# Patient Record
Sex: MALE | Race: BLACK OR AFRICAN AMERICAN | NOT HISPANIC OR LATINO | Employment: OTHER | URBAN - METROPOLITAN AREA
[De-identification: names, ages, dates, MRNs, and addresses within clinical notes are randomized per-mention and may not be internally consistent; named-entity substitution may affect disease eponyms.]

---

## 2018-04-04 ENCOUNTER — HOSPITAL ENCOUNTER (EMERGENCY)
Facility: OTHER | Age: 58
Discharge: HOME | End: 2018-04-04
Attending: EMERGENCY MEDICINE
Payer: MEDICARE

## 2018-04-04 VITALS
BODY MASS INDEX: 39.99 KG/M2 | DIASTOLIC BLOOD PRESSURE: 88 MMHG | HEART RATE: 74 BPM | RESPIRATION RATE: 18 BRPM | HEIGHT: 69 IN | SYSTOLIC BLOOD PRESSURE: 138 MMHG | OXYGEN SATURATION: 95 % | TEMPERATURE: 98 F | WEIGHT: 270 LBS

## 2018-04-04 DIAGNOSIS — M10.9 ACUTE GOUT OF LEFT WRIST, UNSPECIFIED CAUSE: Primary | ICD-10-CM

## 2018-04-04 DIAGNOSIS — M25.532 WRIST PAIN, ACUTE, LEFT: ICD-10-CM

## 2018-04-04 PROCEDURE — 25000003 PHARM REV CODE 250: Performed by: EMERGENCY MEDICINE

## 2018-04-04 PROCEDURE — 99283 EMERGENCY DEPT VISIT LOW MDM: CPT

## 2018-04-04 PROCEDURE — 63600175 PHARM REV CODE 636 W HCPCS: Performed by: EMERGENCY MEDICINE

## 2018-04-04 RX ORDER — IBUPROFEN 600 MG/1
600 TABLET ORAL
Status: COMPLETED | OUTPATIENT
Start: 2018-04-04 | End: 2018-04-04

## 2018-04-04 RX ORDER — TRAMADOL HYDROCHLORIDE 50 MG/1
50 TABLET ORAL EVERY 6 HOURS PRN
Qty: 10 TABLET | Refills: 0 | Status: SHIPPED | OUTPATIENT
Start: 2018-04-04 | End: 2018-04-14

## 2018-04-04 RX ORDER — PREDNISONE 20 MG/1
40 TABLET ORAL
Status: COMPLETED | OUTPATIENT
Start: 2018-04-04 | End: 2018-04-04

## 2018-04-04 RX ORDER — INDOMETHACIN 50 MG/1
50 CAPSULE ORAL
Qty: 21 CAPSULE | Refills: 0 | Status: SHIPPED | OUTPATIENT
Start: 2018-04-04 | End: 2020-03-02 | Stop reason: SDUPTHER

## 2018-04-04 RX ADMIN — PREDNISONE 40 MG: 20 TABLET ORAL at 12:04

## 2018-04-04 RX ADMIN — IBUPROFEN 600 MG: 600 TABLET, FILM COATED ORAL at 12:04

## 2018-04-04 NOTE — ED PROVIDER NOTES
Encounter Date: 4/4/2018    SCRIBE #1 NOTE: IKylie, am scribing for, and in the presence of, Dr. Delvalle.       History     Chief Complaint   Patient presents with    Gout     Pt c/o pain to both hands with swelling to the left hand. Pt reports a hx of gout     Time seen by provider: 11:59 AM    This is a 57 y.o. Male, with a history of HTN, who presents with complaint of left hand pain that began about one week ago. Patient reports gradual onset of hand pain and swelling. He reports pain worsens with movement. He denies any injury or trauma. He reports chronic right hand pain since previous episode years ago, then seen here and dx with gout and carpal tunnel. He has been non-compliant with wrist splint since then. He denies following a gout diet, and reports recent increase in seafood consumption. He reports taking naproxen with little relief. He reports being right hand dominant. He denies fever, chills, numbness, weakness, or skin color change.       The history is provided by the patient.     Review of patient's allergies indicates:  No Known Allergies  Past Medical History:   Diagnosis Date    Arthritis     GERD (gastroesophageal reflux disease)     Hypertension      Past Surgical History:   Procedure Laterality Date    NO PAST SURGERIES       History reviewed. No pertinent family history.  Social History   Substance Use Topics    Smoking status: Never Smoker    Smokeless tobacco: Never Used    Alcohol use Yes      Comment: rarely     Review of Systems   Constitutional: Negative for chills and fever.   HENT: Negative for sore throat.    Respiratory: Negative for shortness of breath.    Cardiovascular: Negative for chest pain.   Gastrointestinal: Negative for nausea.   Genitourinary: Negative for dysuria.   Musculoskeletal: Negative for back pain and neck pain.        Positive for bilateral hand pain and swelling.    Skin: Negative for color change and rash.   Neurological: Negative for  weakness.   Hematological: Does not bruise/bleed easily.       Physical Exam     Initial Vitals [04/04/18 1126]   BP Pulse Resp Temp SpO2   (!) 179/94 94 18 97.2 °F (36.2 °C) 95 %      MAP       122.33         Physical Exam    Nursing note and vitals reviewed.  Constitutional: He appears well-developed and well-nourished. He is not diaphoretic. No distress.   HENT:   Head: Normocephalic and atraumatic.   Right Ear: External ear normal.   Left Ear: External ear normal.   Eyes: EOM are normal. Right eye exhibits no discharge. Left eye exhibits no discharge.   Neck: Normal range of motion.   Cardiovascular: Normal rate, regular rhythm and normal heart sounds.   No murmur heard.  Pulmonary/Chest: Breath sounds normal. No respiratory distress. He has no wheezes. He has no rhonchi. He has no rales.   Abdominal: Soft. There is no tenderness. There is no rebound and no guarding.   Musculoskeletal: He exhibits edema and tenderness.   Left wrist and hand mild swelling and diffuse tenderness. ROM limited secondary to pain, but intact.   Right wrist minimal edema and pain with R hand flexion   Neurological: He is alert and oriented to person, place, and time. He has normal strength. No cranial nerve deficit or sensory deficit.   Skin: Skin is warm and dry. No rash and no abscess noted. No erythema. No pallor.   Psychiatric: He has a normal mood and affect. His behavior is normal. Judgment and thought content normal.         ED Course   Procedures  Labs Reviewed - No data to display     Imaging Results          X-Ray Wrist Complete Left (Final result)  Result time 04/04/18 15:15:49    Final result by Jackie Louie MD (04/04/18 15:15:49)                 Impression:      No acute fracture.  Soft tissue swelling.  Wrist effusion.      Electronically signed by: Jackie Louie MD  Date:    04/04/2018  Time:    15:15             Narrative:    EXAMINATION:  LEFT WRIST    CLINICAL HISTORY:  PAIN    TECHNIQUE:  Three views of  the left wrist were obtained.    COMPARISON:  None    FINDINGS:  There is no evidence of acute fracture, dislocation, or bone destruction.  Soft tissue swelling is present.  There is a wrist effusion.                               X-Ray Hand 3 view Left (Final result)  Result time 04/04/18 15:15:02    Final result by Jackie Louie MD (04/04/18 15:15:02)                 Impression:      Soft tissue swelling and probable wrist joint effusion.  No definite erosion.    Findings discussed with Dr. Delvalle      Electronically signed by: Jackie Louie MD  Date:    04/04/2018  Time:    15:15             Narrative:    EXAMINATION:  LEFT HAND    CLINICAL HISTORY:  SWELLING    TECHNIQUE:  THREE VIEWS OF THE LEFT HAND WERE OBTAINED.    COMPARISON:  NONE    FINDINGS:  Alignment is within normal limits.  mild distal interphalangeal joint space narrowing is present.  no definite erosions or soft tissue calcifications are seen.  there is marked soft tissue swelling.  slight irregularity is noted at posterior base of 1 of the metacarpals seen on lateral view only.  This most likely reflects a fragmented osteophyte although small erosion at this site is not excluded.  A wrist joint effusion is suspected.                                X-Rays:   Independently Interpreted Readings:   Other Readings:  Left Wrist: Wrist joint effusion. No fracture or dislocation.     Medical Decision Making:   Initial Assessment:       57 y.o. Male with a history of HTN presents with one week of worsening left hand and wrist pain and swelling. No known trauma or injury. He had similar right hand edema years ago diagnosed with gout and carpal tunnel. Noncompliant with wrist splint since then and has not followed up with hand surgery. Exam with diffuse edema and tenderness of L wrist and dorsal hand, with ROM intact. No fever, erythema, or sign of septic arthritis. X-rays with no sign of fracture, but does show small wrist effusion. Will  treat suspected L wrist gout exacerbation with prednisone in ER and Rx indocin on discharge. Will also restart right wrist splint for persistent carpal tunnel symptoms and refer to hand clinic.       Clinical Tests:   Radiological Study: Ordered and Reviewed              Attending Attestation:           Physician Attestation for Scribe:  Physician Attestation Statement for Scribe #1: I, Dr. Delvalle, reviewed documentation, as scribed by Kylie Samaniego in my presence, and it is both accurate and complete.                    Clinical Impression:     1. Acute gout of left wrist, unspecified cause    2. Wrist pain, acute, left                                 Stefano CHRISTIANO Delvalle MD  04/05/18 0101

## 2018-04-04 NOTE — ED NOTES
Pt with swelling to bilateral hands x 1 week. Pt reporting hx of gout. Swelling, warmth, redness to Left hand > Right hand. Pt AAOx4 and appropriate at this time. Respirations even and unlabored. No acute distress noted.

## 2019-02-15 ENCOUNTER — HOSPITAL ENCOUNTER (EMERGENCY)
Facility: OTHER | Age: 59
Discharge: HOME | End: 2019-02-15
Attending: EMERGENCY MEDICINE
Payer: MEDICARE

## 2019-02-15 VITALS
OXYGEN SATURATION: 95 % | WEIGHT: 280 LBS | DIASTOLIC BLOOD PRESSURE: 83 MMHG | SYSTOLIC BLOOD PRESSURE: 131 MMHG | TEMPERATURE: 98 F | RESPIRATION RATE: 19 BRPM | HEIGHT: 69 IN | BODY MASS INDEX: 41.47 KG/M2 | HEART RATE: 94 BPM

## 2019-02-15 DIAGNOSIS — J02.9 VIRAL PHARYNGITIS: ICD-10-CM

## 2019-02-15 DIAGNOSIS — J01.80 ACUTE NON-RECURRENT SINUSITIS OF OTHER SINUS: Primary | ICD-10-CM

## 2019-02-15 LAB — DEPRECATED S PYO AG THROAT QL EIA: NEGATIVE

## 2019-02-15 PROCEDURE — 99284 EMERGENCY DEPT VISIT MOD MDM: CPT | Mod: 25

## 2019-02-15 PROCEDURE — 63600175 PHARM REV CODE 636 W HCPCS: Performed by: PHYSICIAN ASSISTANT

## 2019-02-15 PROCEDURE — 96372 THER/PROPH/DIAG INJ SC/IM: CPT

## 2019-02-15 PROCEDURE — 87880 STREP A ASSAY W/OPTIC: CPT

## 2019-02-15 PROCEDURE — 87081 CULTURE SCREEN ONLY: CPT

## 2019-02-15 RX ORDER — DEXAMETHASONE SODIUM PHOSPHATE 4 MG/ML
8 INJECTION, SOLUTION INTRA-ARTICULAR; INTRALESIONAL; INTRAMUSCULAR; INTRAVENOUS; SOFT TISSUE
Status: COMPLETED | OUTPATIENT
Start: 2019-02-15 | End: 2019-02-15

## 2019-02-15 RX ORDER — FLUTICASONE PROPIONATE 50 MCG
1 SPRAY, SUSPENSION (ML) NASAL 2 TIMES DAILY PRN
Qty: 15 G | Refills: 0 | Status: SHIPPED | OUTPATIENT
Start: 2019-02-15

## 2019-02-15 RX ADMIN — DEXAMETHASONE SODIUM PHOSPHATE 8 MG: 4 INJECTION, SOLUTION INTRAMUSCULAR; INTRAVENOUS at 02:02

## 2019-02-16 NOTE — ED PROVIDER NOTES
"Encounter Date: 2/15/2019       History     Chief Complaint   Patient presents with    Sore Throat     x 1 week    "sinus pressure"     > than 1 week     Patient is a 57-year-old male with history of hypertension who presents to the emergency department with sinus pressure.  Patient states over the last 2 weeks he has had congestion and rhinorrhea.  He reports pressure in the frontal region of his head.  He states over the last 2 days he has developed a postnasal drip and sore throat. He denies inability to control secretions.  He denies fevers or chills.  He denies body aches.  He states his pressure in his head feels better if he lies flat, but more bothersome if he is sitting up or leaning forward.  He denies cough or sputum production. He denies neck pain or neck stiffness.  He denies any other associated symptoms.      The history is provided by the patient.     Review of patient's allergies indicates:  No Known Allergies  Past Medical History:   Diagnosis Date    Arthritis     GERD (gastroesophageal reflux disease)     Hypertension      Past Surgical History:   Procedure Laterality Date    NO PAST SURGERIES       No family history on file.  Social History     Tobacco Use    Smoking status: Never Smoker    Smokeless tobacco: Never Used   Substance Use Topics    Alcohol use: Yes     Comment: rarely    Drug use: No     Review of Systems   Constitutional: Negative for activity change, appetite change, chills, fatigue and fever.   HENT: Positive for congestion, postnasal drip, rhinorrhea, sinus pressure, sinus pain, sore throat and trouble swallowing. Negative for ear discharge, ear pain, nosebleeds and voice change.    Respiratory: Negative for cough and shortness of breath.    Cardiovascular: Negative for chest pain.   Gastrointestinal: Negative for abdominal pain, blood in stool, constipation, diarrhea, nausea and vomiting.   Genitourinary: Negative for dysuria, flank pain and hematuria. "   Musculoskeletal: Negative for back pain, neck pain and neck stiffness.   Skin: Negative for rash and wound.   Neurological: Negative for dizziness, weakness, light-headedness and headaches.       Physical Exam     Initial Vitals [02/15/19 1318]   BP Pulse Resp Temp SpO2   (!) 180/94 102 18 98.2 °F (36.8 °C) 95 %      MAP       --         Physical Exam    Nursing note and vitals reviewed.  Constitutional: He appears well-developed and well-nourished. He is not diaphoretic.  Non-toxic appearance. No distress.   HENT:   Head: Normocephalic.   Right Ear: Hearing, tympanic membrane, external ear and ear canal normal.   Left Ear: Hearing and external ear normal. A middle ear effusion is present.   Nose: Mucosal edema and rhinorrhea present. Right sinus exhibits frontal sinus tenderness. Right sinus exhibits no maxillary sinus tenderness. Left sinus exhibits frontal sinus tenderness. Left sinus exhibits no maxillary sinus tenderness.   Mouth/Throat: Uvula is midline and mucous membranes are normal. No trismus in the jaw. No uvula swelling. Posterior oropharyngeal erythema present. No oropharyngeal exudate or tonsillar abscesses.   Posterior oropharyngeal cobblestoning   Eyes: Conjunctivae are normal. Pupils are equal, round, and reactive to light.   Neck: Normal range of motion and full passive range of motion without pain. Neck supple. Normal range of motion present. No neck rigidity.   Cardiovascular: Normal rate and regular rhythm.   Pulmonary/Chest: Breath sounds normal. No respiratory distress. He has no wheezes. He has no rhonchi. He has no rales. He exhibits no tenderness.   Abdominal: Soft. Bowel sounds are normal. There is no tenderness.   Lymphadenopathy:     He has no cervical adenopathy.   Neurological: He is alert and oriented to person, place, and time.   Skin: Skin is warm and dry. Capillary refill takes less than 2 seconds.   Psychiatric: He has a normal mood and affect.         ED Course    Procedures  Labs Reviewed   THROAT SCREEN, RAPID   CULTURE, STREP A,  THROAT          Imaging Results    None          Medical Decision Making:   Initial Assessment:   Urgent evaluation of a 58-year-old male with history of hypertension who presents to the emergency department with nasal congestion and sore throat. Patient is afebrile, nontoxic appearing, and hemodynamically stable. There is nasal mucosal edema.  There is posterior oropharyngeal cobblestoning.  There is posterior oropharyngeal erythema.  Uvula is midline. No trismus.  No evidence of peritonsillar abscess.  Patient is controlling secretions.  I suspect viral sinusitis and viral pharyngitis.  Rapid strep is obtained and is negative. Patient is given Decadron.  Patient is advised to follow up with PCP.  Patient is advised to return to the emergency department with any worsening symptoms or concerns.                      Clinical Impression:   The primary encounter diagnosis was Acute non-recurrent sinusitis of other sinus. A diagnosis of Viral pharyngitis was also pertinent to this visit.                             Amparo Altamirano-SAMMY Tirado  02/16/19 0014

## 2019-02-18 LAB — BACTERIA THROAT CULT: NORMAL

## 2019-09-03 ENCOUNTER — HOSPITAL ENCOUNTER (EMERGENCY)
Facility: OTHER | Age: 59
Discharge: HOME | End: 2019-09-03
Attending: EMERGENCY MEDICINE
Payer: MEDICARE

## 2019-09-03 VITALS
WEIGHT: 285 LBS | DIASTOLIC BLOOD PRESSURE: 80 MMHG | HEIGHT: 69 IN | SYSTOLIC BLOOD PRESSURE: 149 MMHG | BODY MASS INDEX: 42.21 KG/M2 | TEMPERATURE: 98 F | OXYGEN SATURATION: 95 % | HEART RATE: 81 BPM | RESPIRATION RATE: 16 BRPM

## 2019-09-03 DIAGNOSIS — R11.10 VOMITING AND DIARRHEA: Primary | ICD-10-CM

## 2019-09-03 DIAGNOSIS — R19.7 VOMITING AND DIARRHEA: Primary | ICD-10-CM

## 2019-09-03 LAB
ALBUMIN SERPL BCP-MCNC: 5 G/DL (ref 3.5–5.2)
ALP SERPL-CCNC: 88 U/L (ref 55–135)
ALT SERPL W/O P-5'-P-CCNC: 74 U/L (ref 10–44)
ANION GAP SERPL CALC-SCNC: 16 MMOL/L (ref 8–16)
AST SERPL-CCNC: 53 U/L (ref 10–40)
BACTERIA #/AREA URNS HPF: ABNORMAL /HPF
BASOPHILS # BLD AUTO: 0.05 K/UL (ref 0–0.2)
BASOPHILS NFR BLD: 0.8 % (ref 0–1.9)
BILIRUB SERPL-MCNC: 0.7 MG/DL (ref 0.1–1)
BILIRUB UR QL STRIP: NEGATIVE
BUN SERPL-MCNC: 21 MG/DL (ref 6–20)
CALCIUM SERPL-MCNC: 10.9 MG/DL (ref 8.7–10.5)
CHLORIDE SERPL-SCNC: 98 MMOL/L (ref 95–110)
CLARITY UR: CLEAR
CO2 SERPL-SCNC: 28 MMOL/L (ref 23–29)
COLOR UR: YELLOW
CREAT SERPL-MCNC: 1.8 MG/DL (ref 0.5–1.4)
DIFFERENTIAL METHOD: NORMAL
EOSINOPHIL # BLD AUTO: 0.2 K/UL (ref 0–0.5)
EOSINOPHIL NFR BLD: 2.8 % (ref 0–8)
ERYTHROCYTE [DISTWIDTH] IN BLOOD BY AUTOMATED COUNT: 13.9 % (ref 11.5–14.5)
EST. GFR  (AFRICAN AMERICAN): 48 ML/MIN/1.73 M^2
EST. GFR  (NON AFRICAN AMERICAN): 41 ML/MIN/1.73 M^2
GLUCOSE SERPL-MCNC: 103 MG/DL (ref 70–110)
GLUCOSE UR QL STRIP: NEGATIVE
HCT VFR BLD AUTO: 48.1 % (ref 40–54)
HGB BLD-MCNC: 16.2 G/DL (ref 14–18)
HGB UR QL STRIP: ABNORMAL
HYALINE CASTS #/AREA URNS LPF: 12 /LPF
IMM GRANULOCYTES # BLD AUTO: 0.01 K/UL (ref 0–0.04)
IMM GRANULOCYTES NFR BLD AUTO: 0.2 % (ref 0–0.5)
KETONES UR QL STRIP: NEGATIVE
LEUKOCYTE ESTERASE UR QL STRIP: NEGATIVE
LIPASE SERPL-CCNC: 16 U/L (ref 4–60)
LYMPHOCYTES # BLD AUTO: 2.8 K/UL (ref 1–4.8)
LYMPHOCYTES NFR BLD: 46 % (ref 18–48)
MCH RBC QN AUTO: 29.4 PG (ref 27–31)
MCHC RBC AUTO-ENTMCNC: 33.7 G/DL (ref 32–36)
MCV RBC AUTO: 87 FL (ref 82–98)
MICROSCOPIC COMMENT: ABNORMAL
MONOCYTES # BLD AUTO: 0.6 K/UL (ref 0.3–1)
MONOCYTES NFR BLD: 10.3 % (ref 4–15)
NEUTROPHILS # BLD AUTO: 2.5 K/UL (ref 1.8–7.7)
NEUTROPHILS NFR BLD: 39.9 % (ref 38–73)
NITRITE UR QL STRIP: NEGATIVE
NRBC BLD-RTO: 0 /100 WBC
PH UR STRIP: 6 [PH] (ref 5–8)
PLATELET # BLD AUTO: 277 K/UL (ref 150–350)
PMV BLD AUTO: 9.6 FL (ref 9.2–12.9)
POCT GLUCOSE: 82 MG/DL (ref 70–110)
POTASSIUM SERPL-SCNC: 3 MMOL/L (ref 3.5–5.1)
PROT SERPL-MCNC: 8.5 G/DL (ref 6–8.4)
PROT UR QL STRIP: ABNORMAL
RBC # BLD AUTO: 5.51 M/UL (ref 4.6–6.2)
RBC #/AREA URNS HPF: 2 /HPF (ref 0–4)
SODIUM SERPL-SCNC: 142 MMOL/L (ref 136–145)
SP GR UR STRIP: 1.02 (ref 1–1.03)
URN SPEC COLLECT METH UR: ABNORMAL
UROBILINOGEN UR STRIP-ACNC: NEGATIVE EU/DL
WBC # BLD AUTO: 6.13 K/UL (ref 3.9–12.7)
WBC #/AREA URNS HPF: 3 /HPF (ref 0–5)

## 2019-09-03 PROCEDURE — 99283 EMERGENCY DEPT VISIT LOW MDM: CPT | Mod: 25

## 2019-09-03 PROCEDURE — 82962 GLUCOSE BLOOD TEST: CPT

## 2019-09-03 PROCEDURE — 25000003 PHARM REV CODE 250: Performed by: STUDENT IN AN ORGANIZED HEALTH CARE EDUCATION/TRAINING PROGRAM

## 2019-09-03 PROCEDURE — 80053 COMPREHEN METABOLIC PANEL: CPT

## 2019-09-03 PROCEDURE — 85025 COMPLETE CBC W/AUTO DIFF WBC: CPT

## 2019-09-03 PROCEDURE — 83690 ASSAY OF LIPASE: CPT

## 2019-09-03 PROCEDURE — 81000 URINALYSIS NONAUTO W/SCOPE: CPT

## 2019-09-03 RX ORDER — ONDANSETRON 4 MG/1
4 TABLET, FILM COATED ORAL
Status: COMPLETED | OUTPATIENT
Start: 2019-09-03 | End: 2019-09-03

## 2019-09-03 RX ORDER — NIFEDIPINE 90 MG/1
30 TABLET, FILM COATED, EXTENDED RELEASE ORAL DAILY
COMMUNITY

## 2019-09-03 RX ORDER — POTASSIUM CHLORIDE 20 MEQ/1
40 TABLET, EXTENDED RELEASE ORAL
Status: COMPLETED | OUTPATIENT
Start: 2019-09-03 | End: 2019-09-03

## 2019-09-03 RX ORDER — METFORMIN HYDROCHLORIDE 1000 MG/1
1000 TABLET ORAL 2 TIMES DAILY WITH MEALS
COMMUNITY

## 2019-09-03 RX ORDER — ONDANSETRON 4 MG/1
4 TABLET, ORALLY DISINTEGRATING ORAL EVERY 8 HOURS PRN
Qty: 8 TABLET | Refills: 0 | Status: SHIPPED | OUTPATIENT
Start: 2019-09-03

## 2019-09-03 RX ADMIN — POTASSIUM CHLORIDE 40 MEQ: 20 TABLET, EXTENDED RELEASE ORAL at 09:09

## 2019-09-03 RX ADMIN — ONDANSETRON 4 MG: 4 TABLET, FILM COATED ORAL at 08:09

## 2019-09-04 NOTE — ED NOTES
Pt c/o nausea, vomiting x several days; was instructed to come to ED for further eval for elevated blood sugar by his PCP. Denies diarrhea at this time. Pt AAOx4 and appropriate at this time. Respirations even and unlabored. No acute distress noted.

## 2019-09-04 NOTE — ED NOTES
Appearance: Pt awake, alert & oriented to person, place & time. Pt in no acute distress at present time. Pt is clean and well groomed with clothes appropriately fastened.   Skin: Skin warm, dry & intact. Color consistent with ethnicity. Mucous membranes moist. No breakdown or brusing noted.   Musculoskeletal: Patient moving all extremities well, no obvious swelling or deformities noted.   Respiratory: Respirations spontaneous, even, and non-labored. Visible chest rise noted. Airway is open and patent. No accessory muscle use noted.   Neurologic: Sensation is intact. Speech is clear and appropriate. Eyes open spontaneously, behavior appropriate to situation, follows commands,  purposeful motor response noted.   Cardiac:  No Bilateral lower extremity edema. Cap refill is <3 seconds. Pt denies active chest pains, SOB, dizziness, blurred vision, weakness or fatigue at this time.   Abdomen: Pt denies active abd pains, cramping or discomfort, + reports of nausea; no active vomiting from pt at this time.   : Pt reports no dysuria or hematuria.

## 2019-09-04 NOTE — ED PROVIDER NOTES
Encounter Date: 9/3/2019       History     Chief Complaint   Patient presents with    Emesis     with nausea, on and off X a week     Pt is a 59 y/o man w/ PMHx of DM II, HTN who presents to ED c/o diarrhea and vomiting for about 1 week. The diarrhea began about 1 week ago and has subsided over the past 2 days. He denies any bloody or tarry stools. He has had one small normal BM since the diarrhea stopped. When the diarrhea slowed, he began vomiting and feeling nauseous. He denies any blood in his vomitus. Of note, pt was switched to nifedipine for HTN control and began metformin 1000 BID about 4 days prior to his onset of symptoms. He endorses occasional chills, and rarely experiences mild abdominal pain. He denies any HA, SOB, chest pain, flank pain, or dysuria.    Pt also c/o fullness in ears b/l and sinus pressure. He endorses minor eye discharge and b/l orbital itching. He denies a cough, sneezing, or sore throat. These symptoms are chronic. He was given a nose spray (unknown) by his PCP but that did not provide any relief.        Review of patient's allergies indicates:  No Known Allergies  Past Medical History:   Diagnosis Date    Arthritis     Diabetes mellitus     GERD (gastroesophageal reflux disease)     Hypertension      Past Surgical History:   Procedure Laterality Date    NO PAST SURGERIES       No family history on file.  Social History     Tobacco Use    Smoking status: Never Smoker    Smokeless tobacco: Never Used   Substance Use Topics    Alcohol use: Yes     Comment: rarely    Drug use: No     Types: Marijuana     Review of Systems   Constitutional: Positive for appetite change and chills. Negative for diaphoresis and fever.   HENT: Positive for ear pain, sinus pressure and sinus pain. Negative for facial swelling, nosebleeds, rhinorrhea, sore throat and tinnitus.    Eyes: Positive for discharge and itching. Negative for visual disturbance.   Respiratory: Negative for chest tightness,  shortness of breath and wheezing.    Cardiovascular: Negative for chest pain and palpitations.   Gastrointestinal: Positive for diarrhea, nausea and vomiting. Negative for abdominal pain.   Genitourinary: Negative for difficulty urinating.   Musculoskeletal: Negative for myalgias.   Skin: Negative for color change and rash.   Neurological: Negative for dizziness, syncope, light-headedness and headaches.   Hematological: Negative for adenopathy.   Psychiatric/Behavioral: Negative for confusion.   All other systems reviewed and are negative.      Physical Exam     Initial Vitals [09/03/19 1859]   BP Pulse Resp Temp SpO2   137/82 90 18 97.8 °F (36.6 °C) 95 %      MAP       --         Physical Exam    Constitutional: He appears well-developed and well-nourished. No distress.   HENT:   Head: Normocephalic and atraumatic.   Right Ear: Tympanic membrane, external ear and ear canal normal.   Left Ear: Tympanic membrane, external ear and ear canal normal.   Nose: Mucosal edema present. No rhinorrhea.   Mouth/Throat: No oropharyngeal exudate.   Eyes: Conjunctivae and EOM are normal. Right eye exhibits no discharge. Left eye exhibits no discharge. No scleral icterus.   Neck: Normal range of motion. Neck supple.   Cardiovascular: Normal rate, regular rhythm and normal heart sounds.   Pulmonary/Chest: Breath sounds normal. He has no wheezes.   Abdominal: Soft. He exhibits distension. He exhibits no mass. There is no tenderness. There is no guarding.   Musculoskeletal: Normal range of motion.   Lymphadenopathy:     He has no cervical adenopathy.   Neurological: He is alert and oriented to person, place, and time.   Skin: Skin is warm, dry and intact. No rash noted.   Psychiatric: He has a normal mood and affect. His speech is normal and behavior is normal.         ED Course   Procedures  Labs Reviewed   COMPREHENSIVE METABOLIC PANEL - Abnormal; Notable for the following components:       Result Value    Potassium 3.0 (*)     BUN,  Bld 21 (*)     Creatinine 1.8 (*)     Calcium 10.9 (*)     Total Protein 8.5 (*)     AST 53 (*)     ALT 74 (*)     eGFR if  48 (*)     eGFR if non  41 (*)     All other components within normal limits   URINALYSIS, REFLEX TO URINE CULTURE - Abnormal; Notable for the following components:    Protein, UA 1+ (*)     Occult Blood UA 1+ (*)     All other components within normal limits    Narrative:     Preferred Collection Type->Urine, Clean Catch   URINALYSIS MICROSCOPIC - Abnormal; Notable for the following components:    Hyaline Casts, UA 12 (*)     All other components within normal limits    Narrative:     Preferred Collection Type->Urine, Clean Catch   CBC W/ AUTO DIFFERENTIAL   LIPASE   POCT GLUCOSE   POCT GLUCOSE MONITORING CONTINUOUS          Imaging Results    None          Medical Decision Making:   ED Management:  - VSS; NAD  - Pt examined by resident and attending physician  - Hx notable for recent metformin rx  - Pt denies myalgias, fever, sore throat; influenza less likely  - CBC, CMP, POCT glucose, lipase ordered  - Elevated BUN, creatinine; no recent labs for comparison; likely prerenal etiology  - PO hydration and PO K administered  - Will discharge with zofran and adjust metformin dose to 500 BID until follow up with PCP  - Pt was able to tolerate PO challenge  - Stable for discharge                  Attending Attestation:   Physician Attestation Statement for Resident:  As the supervising MD   Physician Attestation Statement: I have personally seen and examined this patient.   I agree with the above history. -: 58-year-old male who presents with complaint of nausea vomiting and diarrhea which is now resolved.  He also reports some URI type symptoms.  Vital signs reveal mild hypertension, afebrile.     As the supervising MD I agree with the above PE.   -: Abdomen is protuberant but soft and nontender. Normal bowel sounds. Nontoxic and well-hydrated appearance.   As the  supervising MD I agree with the above treatment, course, plan, and disposition.   -: Labs do reveal hypokalemia and elevated BUN and creatinine, although not in a prerenal pattern.  No previous for comparison.  He was given oral hydration, potassium repletion, Zofran in the ED with improvement.  I am suspicious that starting high dosage of metformin prior to onset of symptoms may have contributed to diarrhea.  This may represent a viral syndrome or gastroenteritis.  There is no evidence of an acute abdomen.  He is encouraged to increase hydration, symptomatic care for URI symptoms, and given prescription for Zofran as needed for nausea.  He is encouraged close follow-up with his PCP or to return for new or worsening symptoms.  I have reviewed and agree with the residents interpretation of the following: lab data.                       Clinical Impression:       ICD-10-CM ICD-9-CM   1. Vomiting and diarrhea R11.10 787.03    R19.7 787.91         Disposition:   Disposition: Discharged  Condition: Stable       DESTINEY Maria MD  OBGYN PGY1                  Joon Maria MD  Resident  09/03/19 0569       Nancy Felix MD  09/04/19 2286

## 2020-03-02 ENCOUNTER — HOSPITAL ENCOUNTER (EMERGENCY)
Facility: OTHER | Age: 60
Discharge: HOME | End: 2020-03-02
Attending: EMERGENCY MEDICINE
Payer: MEDICARE

## 2020-03-02 VITALS
DIASTOLIC BLOOD PRESSURE: 78 MMHG | WEIGHT: 265 LBS | TEMPERATURE: 98 F | HEIGHT: 69 IN | BODY MASS INDEX: 39.25 KG/M2 | HEART RATE: 84 BPM | OXYGEN SATURATION: 95 % | SYSTOLIC BLOOD PRESSURE: 140 MMHG | RESPIRATION RATE: 18 BRPM

## 2020-03-02 DIAGNOSIS — M10.9 ACUTE GOUT OF RIGHT HAND, UNSPECIFIED CAUSE: Primary | ICD-10-CM

## 2020-03-02 PROCEDURE — 96372 THER/PROPH/DIAG INJ SC/IM: CPT

## 2020-03-02 PROCEDURE — 99284 EMERGENCY DEPT VISIT MOD MDM: CPT | Mod: 25

## 2020-03-02 PROCEDURE — 63600175 PHARM REV CODE 636 W HCPCS: Performed by: PHYSICIAN ASSISTANT

## 2020-03-02 RX ORDER — INDOMETHACIN 50 MG/1
50 CAPSULE ORAL
Qty: 21 CAPSULE | Refills: 0 | OUTPATIENT
Start: 2020-03-02 | End: 2021-01-14

## 2020-03-02 RX ORDER — KETOROLAC TROMETHAMINE 30 MG/ML
30 INJECTION, SOLUTION INTRAMUSCULAR; INTRAVENOUS
Status: COMPLETED | OUTPATIENT
Start: 2020-03-02 | End: 2020-03-02

## 2020-03-02 RX ADMIN — KETOROLAC TROMETHAMINE 30 MG: 30 INJECTION, SOLUTION INTRAMUSCULAR; INTRAVENOUS at 09:03

## 2020-03-02 NOTE — ED NOTES
Patient presents to ed with c/o R hand swelling for one week. Denies trauma. Pt has hx of gout. +tender to touch. Patient denies fever and chills. Patient has no other c/o. Will continue to monitor.

## 2020-03-02 NOTE — ED PROVIDER NOTES
Encounter Date: 3/2/2020       History     Chief Complaint   Patient presents with    hand swelling     Right hand swelling for the past week. Pt has a hx of gout     Patient is a 59 y.o. male with a past medical history of HTN, DM, gout,presenting to the emergency department for evaluation of right hand pain and swelling. The patient states that his symptoms started about a week ago.  He states that he has pain in his right hand, especially by his 2nd and 3rd fingers.  He denies any pain in the wrist.  He admits that this feels similar to a prior episode of gout he had an left hand.  He denies any fall or injury. He denies any numbness, tingling, weakness. He denies any fever chills.  The patient states that he has been eating seafood lately.  He denies taking any medication for symptoms thus far.This is the extent of the patient's complaints at this time.       The history is provided by the patient.     Review of patient's allergies indicates:  No Known Allergies  Past Medical History:   Diagnosis Date    Arthritis     Diabetes mellitus     GERD (gastroesophageal reflux disease)     Gout     Hypertension      Past Surgical History:   Procedure Laterality Date    NO PAST SURGERIES       History reviewed. No pertinent family history.  Social History     Tobacco Use    Smoking status: Never Smoker    Smokeless tobacco: Never Used   Substance Use Topics    Alcohol use: Yes     Comment: rarely    Drug use: No     Types: Marijuana     Review of Systems   Constitutional: Negative for activity change, chills, fatigue and fever.   HENT: Negative for congestion, rhinorrhea and sore throat.    Eyes: Negative for photophobia and visual disturbance.   Respiratory: Negative for cough and shortness of breath.    Cardiovascular: Negative for chest pain.   Gastrointestinal: Negative for abdominal pain, diarrhea, nausea and vomiting.   Genitourinary: Negative for dysuria, hematuria and urgency.   Musculoskeletal:  Negative for back pain, myalgias and neck pain.        Hand pain   Skin: Negative for color change and wound.   Neurological: Negative for weakness and headaches.   Psychiatric/Behavioral: Negative for agitation and confusion.       Physical Exam     Initial Vitals [03/02/20 0905]   BP Pulse Resp Temp SpO2   138/82 96 18 98.3 °F (36.8 °C) 95 %      MAP       --         Physical Exam    Nursing note and vitals reviewed.  Constitutional: Vital signs are normal. He appears well-developed and well-nourished. He is not diaphoretic.  Non-toxic appearance. He does not have a sickly appearance. No distress.   Well-appearing,  male accompanied the emergency room.  Speaking clearly full sentences.  No acute distress.   HENT:   Head: Normocephalic and atraumatic.   Right Ear: External ear normal.   Left Ear: External ear normal.   Nose: Nose normal.   Mouth/Throat: Oropharynx is clear and moist.   Eyes: Conjunctivae and EOM are normal.   Neck: Normal range of motion. Neck supple.   Cardiovascular: Normal rate, regular rhythm and normal heart sounds.   Pulmonary/Chest: Breath sounds normal. No respiratory distress. He has no wheezes.   Musculoskeletal: Normal range of motion.   Erythema with warmth and tenderness to palpation of the right hand most significant near the 2nd, 3rd metacarpals into the MCP joints. No palpable bony deformity, crepitus, step-off.  No involvement of the wrist.  No lacerations or abrasions.  Normal range of motion however reports pain with range of motion.  Good capillary refill.   Neurological: He is alert and oriented to person, place, and time. GCS eye subscore is 4. GCS verbal subscore is 5. GCS motor subscore is 6.   Skin: Skin is warm.   Psychiatric: He has a normal mood and affect. His behavior is normal. Judgment and thought content normal.         ED Course   Procedures  Labs Reviewed - No data to display          Medical Decision Making:   Initial Assessment:     Urgent  evaluation of a 59 y.o. male with a past medical history of hypertension, diabetes, gout, presenting to the emergency department complaining of right hand pain and swelling. Patient is afebrile, nontoxic appearing and hemodynamically stable. Physical exam reveals erythema with edema and tenderness to palpation of the right hand.  No history of injury or trauma. No drainage.  Patient is afebrile with normal vital signs.      ED Management:    Given the patient's history and physical exam findings, do believe his signs symptoms are likely secondary to gout.  I did consider other etiologies however the patient does not have any history of injury or trauma.  Afebrile.  He does have a history of gout with similar presentation.  Will treat with indomethacin.  He is also counseled on home care and treatment in addition to diet changes.  He is discharged home in stable condition.The patient was instructed to follow up with a primary care provider in 2 days or to return to the emergency department for worsening symptoms. The treatment plan was discussed with the patient who demonstrated understanding and comfort with plan.      This note was created using [employer] Fluency Direct. There may be typographical errors secondary to dictation.                                    Clinical Impression:     1. Acute gout of right hand, unspecified cause         Disposition:   Disposition: Discharged  Condition: Stable                   Joyce López PA-C  03/02/20 0956

## 2021-01-14 ENCOUNTER — HOSPITAL ENCOUNTER (EMERGENCY)
Facility: OTHER | Age: 61
Discharge: HOME | End: 2021-01-14
Attending: EMERGENCY MEDICINE
Payer: MEDICARE

## 2021-01-14 VITALS
OXYGEN SATURATION: 97 % | WEIGHT: 260 LBS | DIASTOLIC BLOOD PRESSURE: 82 MMHG | BODY MASS INDEX: 39.4 KG/M2 | RESPIRATION RATE: 18 BRPM | HEART RATE: 88 BPM | TEMPERATURE: 98 F | HEIGHT: 68 IN | SYSTOLIC BLOOD PRESSURE: 140 MMHG

## 2021-01-14 DIAGNOSIS — M79.672 FOOT PAIN, LEFT: ICD-10-CM

## 2021-01-14 DIAGNOSIS — M10.9 EXACERBATION OF GOUT: Primary | ICD-10-CM

## 2021-01-14 LAB — POCT GLUCOSE: 154 MG/DL (ref 70–110)

## 2021-01-14 PROCEDURE — 63600175 PHARM REV CODE 636 W HCPCS: Performed by: EMERGENCY MEDICINE

## 2021-01-14 PROCEDURE — 82962 GLUCOSE BLOOD TEST: CPT

## 2021-01-14 PROCEDURE — 96372 THER/PROPH/DIAG INJ SC/IM: CPT

## 2021-01-14 PROCEDURE — 99284 EMERGENCY DEPT VISIT MOD MDM: CPT | Mod: 25

## 2021-01-14 RX ORDER — CLOTRIMAZOLE 1 %
CREAM (GRAM) TOPICAL
Qty: 15 G | Refills: 0 | Status: SHIPPED | OUTPATIENT
Start: 2021-01-14

## 2021-01-14 RX ORDER — KETOROLAC TROMETHAMINE 30 MG/ML
15 INJECTION, SOLUTION INTRAMUSCULAR; INTRAVENOUS
Status: COMPLETED | OUTPATIENT
Start: 2021-01-14 | End: 2021-01-14

## 2021-01-14 RX ORDER — PREDNISONE 20 MG/1
40 TABLET ORAL DAILY
Qty: 10 TABLET | Refills: 0 | Status: SHIPPED | OUTPATIENT
Start: 2021-01-15 | End: 2021-01-20

## 2021-01-14 RX ORDER — DEXAMETHASONE SODIUM PHOSPHATE 4 MG/ML
4 INJECTION, SOLUTION INTRA-ARTICULAR; INTRALESIONAL; INTRAMUSCULAR; INTRAVENOUS; SOFT TISSUE
Status: COMPLETED | OUTPATIENT
Start: 2021-01-14 | End: 2021-01-14

## 2021-01-14 RX ORDER — IBUPROFEN 600 MG/1
600 TABLET ORAL EVERY 8 HOURS PRN
Qty: 30 TABLET | Refills: 0 | Status: SHIPPED | OUTPATIENT
Start: 2021-01-14

## 2021-01-14 RX ADMIN — DEXAMETHASONE SODIUM PHOSPHATE 4 MG: 4 INJECTION INTRA-ARTICULAR; INTRALESIONAL; INTRAMUSCULAR; INTRAVENOUS; SOFT TISSUE at 01:01

## 2021-01-14 RX ADMIN — KETOROLAC TROMETHAMINE 15 MG: 30 INJECTION, SOLUTION INTRAMUSCULAR at 01:01

## 2022-08-12 ENCOUNTER — HOSPITAL ENCOUNTER (EMERGENCY)
Facility: OTHER | Age: 62
Discharge: HOME | End: 2022-08-12
Attending: EMERGENCY MEDICINE
Payer: MEDICARE

## 2022-08-12 VITALS
WEIGHT: 260 LBS | HEIGHT: 69 IN | HEART RATE: 91 BPM | SYSTOLIC BLOOD PRESSURE: 140 MMHG | BODY MASS INDEX: 38.51 KG/M2 | OXYGEN SATURATION: 94 % | DIASTOLIC BLOOD PRESSURE: 80 MMHG | TEMPERATURE: 98 F | RESPIRATION RATE: 18 BRPM

## 2022-08-12 DIAGNOSIS — M25.539 PAIN IN WRIST, UNSPECIFIED LATERALITY: ICD-10-CM

## 2022-08-12 DIAGNOSIS — M10.9 ACUTE GOUT OF LEFT WRIST, UNSPECIFIED CAUSE: Primary | ICD-10-CM

## 2022-08-12 PROCEDURE — 96372 THER/PROPH/DIAG INJ SC/IM: CPT | Performed by: EMERGENCY MEDICINE

## 2022-08-12 PROCEDURE — 99284 EMERGENCY DEPT VISIT MOD MDM: CPT

## 2022-08-12 PROCEDURE — 63600175 PHARM REV CODE 636 W HCPCS: Performed by: EMERGENCY MEDICINE

## 2022-08-12 RX ORDER — PREDNISONE 20 MG/1
40 TABLET ORAL
Status: COMPLETED | OUTPATIENT
Start: 2022-08-12 | End: 2022-08-12

## 2022-08-12 RX ORDER — PREDNISONE 10 MG/1
10 TABLET ORAL DAILY
Qty: 21 TABLET | Refills: 0 | Status: SHIPPED | OUTPATIENT
Start: 2022-08-12 | End: 2023-03-15 | Stop reason: SDUPTHER

## 2022-08-12 RX ORDER — INDOMETHACIN 50 MG/1
50 CAPSULE ORAL 3 TIMES DAILY
Qty: 25 CAPSULE | Refills: 0 | Status: SHIPPED | OUTPATIENT
Start: 2022-08-12

## 2022-08-12 RX ORDER — HYDROCODONE BITARTRATE AND ACETAMINOPHEN 5; 325 MG/1; MG/1
1 TABLET ORAL EVERY 4 HOURS PRN
Qty: 12 TABLET | Refills: 0 | Status: SHIPPED | OUTPATIENT
Start: 2022-08-12 | End: 2023-03-15

## 2022-08-12 RX ORDER — KETOROLAC TROMETHAMINE 30 MG/ML
15 INJECTION, SOLUTION INTRAMUSCULAR; INTRAVENOUS
Status: COMPLETED | OUTPATIENT
Start: 2022-08-12 | End: 2022-08-12

## 2022-08-12 RX ADMIN — KETOROLAC TROMETHAMINE 15 MG: 30 INJECTION, SOLUTION INTRAMUSCULAR; INTRAVENOUS at 10:08

## 2022-08-12 RX ADMIN — PREDNISONE 40 MG: 20 TABLET ORAL at 10:08

## 2022-08-13 NOTE — FIRST PROVIDER EVALUATION
"Medical screening exam completed.  I have conducted a focused provider triage encounter, findings are as follows:    Brief history of present illness:  Hx of gout, reports left wrist pain/swelling x 1 week.  Cant deal w pain any longer    Vitals:    08/12/22 1958   BP: 129/71   BP Location: Left arm   Patient Position: Sitting   Pulse: 109   Resp: 18   Temp: 98.1 °F (36.7 °C)   TempSrc: Oral   SpO2: 95%   Weight: 117.9 kg (260 lb)   Height: 5' 9" (1.753 m)       Pertinent physical exam:  Left wrist swollen, warm to touch    Brief workup plan:  eval once roomed    Preliminary workup initiated; this workup will be continued and followed by the physician or advanced practice provider that is assigned to the patient when roomed.  "

## 2022-08-13 NOTE — ED PROVIDER NOTES
Encounter Date: 8/12/2022       History     Chief Complaint   Patient presents with    Joint Pain     Pt states he has gout and it has flared up over the past week and today the pain has gotten unbearable - pt has swelling to left wrist/forearm      60 yo man with chronic gout flares that presents for pain and swelling in the left wrist which he has been trying to deal with over last several days.  This feels similar to previous episodes of gout flares which she has suffered in the past but generally tries to write out the pain until it becomes unbearable at which time he comes to the hospital.  He denies any trauma, fevers, chills, injuries elsewhere.  Says this feels like previous episodes of gout he has had in the past.        Review of patient's allergies indicates:  No Known Allergies  Past Medical History:   Diagnosis Date    Arthritis     Diabetes mellitus     GERD (gastroesophageal reflux disease)     Gout     Hypertension      Past Surgical History:   Procedure Laterality Date    NO PAST SURGERIES       History reviewed. No pertinent family history.  Social History     Tobacco Use    Smoking status: Never Smoker    Smokeless tobacco: Never Used   Substance Use Topics    Alcohol use: Yes     Comment: rarely    Drug use: No     Types: Marijuana     Review of Systems  Constitutional-no fever  HEENT-no congestion  Eyes-no redness  Respiratory-no shortness of breath  Cardio-no chest pain  GI-no abdominal pain  Endocrine-no cold intolerance  -no difficulty urinating  MSK-positive wrist pain  Skin-no rashes  Allergy-no environmental allergy  Neurologic-, no headache  Hematology-no swollen nodes  Behavioral-no confusion  Physical Exam     Initial Vitals [08/12/22 1958]   BP Pulse Resp Temp SpO2   129/71 109 18 98.1 °F (36.7 °C) 95 %      MAP       --         Physical Exam  Constitutional:  Uncomfortable appearing 61-year-old man in mild distress  Eyes: Conjunctivae normal.  ENT       Head:  Normocephalic, atraumatic.       Nose: Normal external appearance        Mouth/Throat: no strigulous respirations   Hematological/Lymphatic/Immunilogical: no visible lymphadenopathy   Cardiovascular: Normal rate,   Respiratory: Normal respiratory effort.   Gastrointestinal: non distended   Musculoskeletal:  Normal range of motion in the shoulders, elbows, slightly diminished range of motion in the left wrist, mild erythema, markedly tender to palpation  Neurologic: Alert, oriented. Normal speech and language. No gross focal neurologic deficits are appreciated.  Skin: Skin is warm, dry. No rash noted.  Psychiatric: Mood and affect are normal.   ED Course   Procedures  Labs Reviewed - No data to display       Imaging Results    None          Medications   ketorolac injection 15 mg (15 mg Intramuscular Given 8/12/22 2208)   predniSONE tablet 40 mg (40 mg Oral Given 8/12/22 2208)     Medical Decision Making:   History:   Old Medical Records: I decided to obtain old medical records.  Old Records Summarized: records from clinic visits and records from previous admission(s).  Differential Diagnosis:   Gout, fracture, septic arthritis  ED Management:  This gentleman has symptoms consistent previous episodes of gout flares.  We discussed for some time the prophylaxis of gout, the inciting agents such as seafood, alcohol both of which he consumed prior to this onset.  He denies any injuries to this making fracture very unlikely no systemic signs of illness limiting likely notice of septic arthritis.  Will plan for treatment of gout, discussed the importance of outpatient follow-up for gout prophylaxis.                      Clinical Impression:   Final diagnoses:  [M10.9] Acute gout of left wrist, unspecified cause (Primary)  [M25.539] Pain in wrist, unspecified laterality          ED Disposition Condition    Discharge Stable        ED Prescriptions     Medication Sig Dispense Start Date End Date Auth. Provider    predniSONE  (DELTASONE) 10 MG tablet Take 1 tablet (10 mg total) by mouth once daily. Take 4 tabs x 3 days, then  Take 2 tabs x 3 days, then   Take 1 tab x 3 days. 21 tablet 8/12/2022  Mario Etienne MD    indomethacin (INDOCIN) 50 MG capsule Take 1 capsule (50 mg total) by mouth 3 (three) times daily. 25 capsule 8/12/2022  Mario Etienne MD    HYDROcodone-acetaminophen (NORCO) 5-325 mg per tablet Take 1 tablet by mouth every 4 (four) hours as needed for Pain. 12 tablet 8/12/2022  Mario Etienne MD        Follow-up Information     Follow up With Specialties Details Why Contact Info    [facility] - Emergency Dept Emergency Medicine Go to  As needed, For a follow up visit about today [address]  [facility] [id number] [phone]           Mario Etienne MD  08/13/22 5286

## 2022-08-13 NOTE — DISCHARGE INSTRUCTIONS
Mr. Guardado,    Thank you for letting me care for you today! It was nice meeting you, and I hope you feel better soon.   If you would like access to your chart and what was done today please utilize the [facility].   Please come back to [facility] for all of your future medical needs.    Our goal in the emergency department is to always give you outstanding care and exceptional service. You may receive a survey by mail or e-mail in the next week regarding your experience in our ED. We would greatly appreciate you completing and returning the survey. Your feedback provides us with a way to recognize our staff who give very good care and it helps us learn how to improve when your experience was below our aspiration of excellence.     Sincerely,    Mario Etienne MD  Board Certified Emergency Physician

## 2023-03-15 ENCOUNTER — HOSPITAL ENCOUNTER (EMERGENCY)
Facility: OTHER | Age: 63
Discharge: HOME | End: 2023-03-15
Attending: EMERGENCY MEDICINE
Payer: MEDICARE

## 2023-03-15 VITALS
SYSTOLIC BLOOD PRESSURE: 154 MMHG | RESPIRATION RATE: 20 BRPM | OXYGEN SATURATION: 95 % | DIASTOLIC BLOOD PRESSURE: 87 MMHG | HEIGHT: 69 IN | WEIGHT: 287 LBS | TEMPERATURE: 98 F | BODY MASS INDEX: 42.51 KG/M2 | HEART RATE: 94 BPM

## 2023-03-15 DIAGNOSIS — M10.9 GOUTY ARTHRITIS: Primary | ICD-10-CM

## 2023-03-15 PROCEDURE — 96372 THER/PROPH/DIAG INJ SC/IM: CPT | Mod: 59 | Performed by: EMERGENCY MEDICINE

## 2023-03-15 PROCEDURE — 29125 APPL SHORT ARM SPLINT STATIC: CPT | Mod: LT

## 2023-03-15 PROCEDURE — 63600175 PHARM REV CODE 636 W HCPCS: Performed by: EMERGENCY MEDICINE

## 2023-03-15 PROCEDURE — 99284 EMERGENCY DEPT VISIT MOD MDM: CPT

## 2023-03-15 RX ORDER — OXYCODONE AND ACETAMINOPHEN 5; 325 MG/1; MG/1
1 TABLET ORAL EVERY 4 HOURS PRN
Qty: 12 TABLET | Refills: 0 | Status: SHIPPED | OUTPATIENT
Start: 2023-03-15 | End: 2023-03-18

## 2023-03-15 RX ORDER — PREDNISONE 10 MG/1
10 TABLET ORAL DAILY
Qty: 21 TABLET | Refills: 0 | Status: SHIPPED | OUTPATIENT
Start: 2023-03-15

## 2023-03-15 RX ORDER — DEXAMETHASONE SODIUM PHOSPHATE 4 MG/ML
8 INJECTION, SOLUTION INTRA-ARTICULAR; INTRALESIONAL; INTRAMUSCULAR; INTRAVENOUS; SOFT TISSUE
Status: COMPLETED | OUTPATIENT
Start: 2023-03-15 | End: 2023-03-15

## 2023-03-15 RX ORDER — KETOROLAC TROMETHAMINE 30 MG/ML
30 INJECTION, SOLUTION INTRAMUSCULAR; INTRAVENOUS
Status: COMPLETED | OUTPATIENT
Start: 2023-03-15 | End: 2023-03-15

## 2023-03-15 RX ADMIN — DEXAMETHASONE SODIUM PHOSPHATE 8 MG: 4 INJECTION, SOLUTION INTRA-ARTICULAR; INTRALESIONAL; INTRAMUSCULAR; INTRAVENOUS; SOFT TISSUE at 08:03

## 2023-03-15 RX ADMIN — KETOROLAC TROMETHAMINE 30 MG: 30 INJECTION, SOLUTION INTRAMUSCULAR; INTRAVENOUS at 08:03

## 2023-03-15 NOTE — ED TRIAGE NOTES
C/O L hand, L wrist, and knee pain onset 1 week ago; PMH gout. Swelling noted to L hand and wrist. Recently received a prescription of allopurinol yesterday by PCP. Noncompliant with gout diet.

## 2023-03-15 NOTE — ED PROVIDER NOTES
Encounter Date: 3/15/2023    SCRIBE #1 NOTE: I, Juliann Connor, am scribing for, and in the presence of,  Richard Silveira MD.     History     Chief Complaint   Patient presents with    Gout     Reports gout flare up onset 1 week ago to L wrist/hand and l knee. Swelling noted. Reports 10/10 pain to areas. Recently received a prescription of allopurinol yesterday by PCP.      Time seen by provider: 8:25 AM    This is a 62 y.o. male with a PMHx of gout, presents with complaint of gouty arthritis. Patient states that his symptoms are typical in both character and quality of his gout flare ups. He notes pain in his left wrist and left knee, with no associated fall or injury. He reports a poor diet. The patient does not drink or smoke. This is the extent of the patient's complaints at this time.          The history is provided by the patient.   Review of patient's allergies indicates:  No Known Allergies  Past Medical History:   Diagnosis Date    Arthritis     Diabetes mellitus     GERD (gastroesophageal reflux disease)     Gout     Hypertension      Past Surgical History:   Procedure Laterality Date    NO PAST SURGERIES       No family history on file.  Social History     Tobacco Use    Smoking status: Never    Smokeless tobacco: Never   Substance Use Topics    Alcohol use: Yes     Comment: rarely    Drug use: No     Types: Marijuana     Review of Systems   Constitutional:  Negative for chills and fever.   HENT:  Negative for congestion and sore throat.    Eyes:  Negative for photophobia and redness.   Respiratory:  Negative for cough and shortness of breath.    Cardiovascular:  Negative for chest pain.   Gastrointestinal:  Negative for abdominal pain, nausea and vomiting.   Genitourinary:  Negative for dysuria.   Musculoskeletal:  Positive for arthralgias and joint swelling. Negative for back pain.   Skin:  Negative for rash.   Neurological:  Negative for weakness, light-headedness and headaches.    Psychiatric/Behavioral:  Negative for confusion.      Physical Exam     Initial Vitals [03/15/23 0805]   BP Pulse Resp Temp SpO2   (!) 154/87 94 20 98.1 °F (36.7 °C) 95 %      MAP       --         Physical Exam    Nursing note and vitals reviewed.  Constitutional: He appears well-developed and well-nourished. He is not diaphoretic. No distress.   HENT:   Head: Normocephalic and atraumatic.   Mouth/Throat: Oropharynx is clear and moist.   Eyes: Conjunctivae and EOM are normal. Pupils are equal, round, and reactive to light.   Conjunctivae pink, clear, and intact.    Neck: Neck supple.   No cervical lymphadenopathy.    Normal range of motion.  Cardiovascular:  Normal rate, regular rhythm and normal heart sounds.     Exam reveals no gallop and no friction rub.       No murmur heard.  Radial pulses 2+.    Pulmonary/Chest: Breath sounds normal. No respiratory distress. He has no wheezes.   Abdominal: Abdomen is soft. Bowel sounds are normal. There is no abdominal tenderness.   Musculoskeletal:         General: Edema present. No tenderness. Normal range of motion.      Cervical back: Normal range of motion and neck supple.      Comments: Edema and swelling to the left wrist/hand. Left upper extremity. Left knee tender to palpation.      Lymphadenopathy:     He has no cervical adenopathy.   Neurological: He is alert and oriented to person, place, and time.   Skin: Skin is warm and dry. Capillary refill takes less than 2 seconds. No rash noted. No pallor.   Capillary refill <2 seconds. No clinical evidence of cellulitis, infection, or abscess formation. No septic joint. No erythema.    Psychiatric: He has a normal mood and affect. Thought content normal.       ED Course   Splint Application    Date/Time: 3/15/2023 8:19 AM  Performed by: Richard Silveira MD  Authorized by: Richard Silveira MD   Consent Done: Not Needed  Location details: left wrist  Splint type: Cock up velcro wrist spling.  Supplies used: Cock up  velcro wrist spling.  Post-procedure: The splinted body part was neurovascularly unchanged following the procedure.  Patient tolerance: Patient tolerated the procedure well with no immediate complications      Labs Reviewed - No data to display       Imaging Results    None          Medications   dexAMETHasone injection 8 mg (8 mg Intramuscular Given 3/15/23 0818)   ketorolac injection 30 mg (30 mg Intramuscular Given 3/15/23 0818)                Attending Attestation:             Attending ED Notes:   Emergent evaluation of a 62-year-old male with complaint of a flare-up of his gout in his left wrist and left knee.  Since denies trauma.  Patient is afebrile, nontoxic-appearing stable vital signs except for elevation of blood pressure.  No clinical evidence of infection, cellulitis or septic joint.  The patient is extensively counseled on their diagnosis and treatment including all physical exam findings.  The patient is discharged good condition and directed follow-up with their PCP  in the next 24-48 hours.                 Clinical Impression:   Final diagnoses:  [M10.9] Gouty arthritis (Primary)        ED Disposition Condition    Discharge Good          ED Prescriptions       Medication Sig Dispense Start Date End Date Auth. Provider    oxyCODONE-acetaminophen (PERCOCET) 5-325 mg per tablet () Take 1 tablet by mouth every 4 (four) hours as needed for Pain. 12 tablet 3/15/2023 3/18/2023 Richard Silveira MD    predniSONE (DELTASONE) 10 MG tablet Take 1 tablet (10 mg total) by mouth once daily. Take 4 tabs x 3 days, then  Take 2 tabs x 3 days, then   Take 1 tab x 3 days. 21 tablet 3/15/2023 -- Richard Silveira MD          Follow-up Information       Follow up With Specialties Details Why Contact Info    [facility] RHEUMATOLOGY Rheumatology In 2 days  [address] [id number] [phone]             Richard Silveira MD  [phone]

## 2024-12-29 ENCOUNTER — HOSPITAL ENCOUNTER (EMERGENCY)
Facility: OTHER | Age: 64
Discharge: HOME | End: 2024-12-29
Attending: EMERGENCY MEDICINE
Payer: MEDICARE

## 2024-12-29 VITALS
TEMPERATURE: 98 F | SYSTOLIC BLOOD PRESSURE: 161 MMHG | BODY MASS INDEX: 41.47 KG/M2 | OXYGEN SATURATION: 98 % | DIASTOLIC BLOOD PRESSURE: 82 MMHG | HEIGHT: 69 IN | RESPIRATION RATE: 20 BRPM | WEIGHT: 280 LBS | HEART RATE: 69 BPM

## 2024-12-29 DIAGNOSIS — R06.83 SNORES: ICD-10-CM

## 2024-12-29 DIAGNOSIS — I10 HYPERTENSION, UNSPECIFIED TYPE: Primary | ICD-10-CM

## 2024-12-29 DIAGNOSIS — R42 DIZZINESS: ICD-10-CM

## 2024-12-29 LAB
ALBUMIN SERPL BCP-MCNC: 4.7 G/DL (ref 3.5–5.2)
ALP SERPL-CCNC: 93 U/L (ref 40–150)
ALT SERPL W/O P-5'-P-CCNC: 49 U/L (ref 10–44)
ANION GAP SERPL CALC-SCNC: 13 MMOL/L (ref 8–16)
AST SERPL-CCNC: 33 U/L (ref 10–40)
BASOPHILS # BLD AUTO: 0.05 K/UL (ref 0–0.2)
BASOPHILS NFR BLD: 1.1 % (ref 0–1.9)
BILIRUB SERPL-MCNC: 0.7 MG/DL (ref 0.1–1)
BNP SERPL-MCNC: <10 PG/ML (ref 0–99)
BUN SERPL-MCNC: 7 MG/DL (ref 8–23)
CALCIUM SERPL-MCNC: 10 MG/DL (ref 8.7–10.5)
CHLORIDE SERPL-SCNC: 104 MMOL/L (ref 95–110)
CO2 SERPL-SCNC: 24 MMOL/L (ref 23–29)
CREAT SERPL-MCNC: 1.1 MG/DL (ref 0.5–1.4)
DIFFERENTIAL METHOD BLD: NORMAL
EOSINOPHIL # BLD AUTO: 0.2 K/UL (ref 0–0.5)
EOSINOPHIL NFR BLD: 4.3 % (ref 0–8)
ERYTHROCYTE [DISTWIDTH] IN BLOOD BY AUTOMATED COUNT: 13.8 % (ref 11.5–14.5)
EST. GFR  (NO RACE VARIABLE): >60 ML/MIN/1.73 M^2
GLUCOSE SERPL-MCNC: 148 MG/DL (ref 70–110)
HCT VFR BLD AUTO: 47.3 % (ref 40–54)
HGB BLD-MCNC: 16.3 G/DL (ref 14–18)
IMM GRANULOCYTES # BLD AUTO: 0.01 K/UL (ref 0–0.04)
IMM GRANULOCYTES NFR BLD AUTO: 0.2 % (ref 0–0.5)
LYMPHOCYTES # BLD AUTO: 2 K/UL (ref 1–4.8)
LYMPHOCYTES NFR BLD: 43.6 % (ref 18–48)
MCH RBC QN AUTO: 30.6 PG (ref 27–31)
MCHC RBC AUTO-ENTMCNC: 34.5 G/DL (ref 32–36)
MCV RBC AUTO: 89 FL (ref 82–98)
MONOCYTES # BLD AUTO: 0.6 K/UL (ref 0.3–1)
MONOCYTES NFR BLD: 11.9 % (ref 4–15)
NEUTROPHILS # BLD AUTO: 1.8 K/UL (ref 1.8–7.7)
NEUTROPHILS NFR BLD: 38.9 % (ref 38–73)
NRBC BLD-RTO: 0 /100 WBC
PLATELET # BLD AUTO: 283 K/UL (ref 150–450)
PMV BLD AUTO: 10.1 FL (ref 9.2–12.9)
POTASSIUM SERPL-SCNC: 3.4 MMOL/L (ref 3.5–5.1)
PROT SERPL-MCNC: 8.3 G/DL (ref 6–8.4)
RBC # BLD AUTO: 5.33 M/UL (ref 4.6–6.2)
SODIUM SERPL-SCNC: 141 MMOL/L (ref 136–145)
TROPONIN I SERPL DL<=0.01 NG/ML-MCNC: <0.006 NG/ML (ref 0–0.03)
WBC # BLD AUTO: 4.63 K/UL (ref 3.9–12.7)

## 2024-12-29 PROCEDURE — 84484 ASSAY OF TROPONIN QUANT: CPT | Performed by: EMERGENCY MEDICINE

## 2024-12-29 PROCEDURE — 85025 COMPLETE CBC W/AUTO DIFF WBC: CPT | Performed by: EMERGENCY MEDICINE

## 2024-12-29 PROCEDURE — 25000003 PHARM REV CODE 250: Performed by: EMERGENCY MEDICINE

## 2024-12-29 PROCEDURE — 80053 COMPREHEN METABOLIC PANEL: CPT | Performed by: EMERGENCY MEDICINE

## 2024-12-29 PROCEDURE — 99285 EMERGENCY DEPT VISIT HI MDM: CPT | Mod: 25

## 2024-12-29 PROCEDURE — 83880 ASSAY OF NATRIURETIC PEPTIDE: CPT | Performed by: EMERGENCY MEDICINE

## 2024-12-29 PROCEDURE — 93010 ELECTROCARDIOGRAM REPORT: CPT | Mod: ,,, | Performed by: INTERNAL MEDICINE

## 2024-12-29 PROCEDURE — 93005 ELECTROCARDIOGRAM TRACING: CPT

## 2024-12-29 RX ORDER — MECLIZINE HYDROCHLORIDE 25 MG/1
25 TABLET ORAL 3 TIMES DAILY PRN
Qty: 20 TABLET | Refills: 0 | Status: SHIPPED | OUTPATIENT
Start: 2024-12-29

## 2024-12-29 RX ORDER — FLUTICASONE PROPIONATE 50 MCG
1 SPRAY, SUSPENSION (ML) NASAL 2 TIMES DAILY PRN
Qty: 15 G | Refills: 0 | Status: SHIPPED | OUTPATIENT
Start: 2024-12-29

## 2024-12-29 RX ORDER — MECLIZINE HYDROCHLORIDE 25 MG/1
25 TABLET ORAL
Status: COMPLETED | OUTPATIENT
Start: 2024-12-29 | End: 2024-12-29

## 2024-12-29 RX ADMIN — MECLIZINE HYDROCHLORIDE 25 MG: 25 TABLET ORAL at 12:12

## 2024-12-29 NOTE — ED TRIAGE NOTES
Pt arrived to ED complaining of feeling like the room was spinning and dizziness that started this morning when he sat up in bed. Pt states that he went to his chair and sat down with out difficulty and felt better pt also has bilateral ear pain for a few days.

## 2024-12-29 NOTE — ED PROVIDER NOTES
"Encounter Date: 12/29/2024       History     Chief Complaint   Patient presents with    Dizziness     Episode of "room spinning" dizziness when waking up this am. Pt reports he was able to stand and get into chair and symptoms improved. Only mild light-headedness at this time. Pt reports similar symptoms intermittently xMonths. Also c/o BL ear discomfort.     This is a pleasant 65 yo man that woke up with the sensation of the room spinning.  He became very anxious at that time called out to his girlfriend who came in some helped him getting up out of the bed he sat down and with some deep breathing noted that the dizziness and room spinning sensation seemed to resolve.  Over last several months he notes that he has had episodic pain and ringing in both ears and occasionally it bounces back and forth between the years but he has not been evaluated for this previously.  He denies any fevers, chills, known sick contacts.  He has had some episodes of dizziness but none this intense in the past.  Notes that he is known to snore while sleeping but has never been evaluated for the same.  Has not take anything for the symptoms, nothing in particular seems make them any better    The history is provided by the patient and medical records.     Review of patient's allergies indicates:  No Known Allergies  Past Medical History:   Diagnosis Date    Arthritis     Diabetes mellitus     GERD (gastroesophageal reflux disease)     Gout     Hypertension      Past Surgical History:   Procedure Laterality Date    NO PAST SURGERIES       No family history on file.  Social History     Tobacco Use    Smoking status: Never    Smokeless tobacco: Never   Substance Use Topics    Alcohol use: Yes     Comment: rarely    Drug use: No     Types: Marijuana     Review of Systems  Constitutional-no fever  HEENT-no congestion  Eyes-no redness  Respiratory-no shortness of breath  Cardio-no chest pain  GI-no abdominal pain  Endocrine-no cold " intolerance  -no difficulty urinating  MSK-no myalgias  Skin-no rashes  Allergy-no environmental allergy  Neurologic-, no headache positive dizziness  Hematology-no swollen nodes  Behavioral-no confusion  Physical Exam     Initial Vitals [12/29/24 1010]   BP Pulse Resp Temp SpO2   (!) 177/89 95 18 97.4 °F (36.3 °C) 97 %      MAP       --         Physical Exam  Constitutional:  Generally well-appearing 64-year-old man in mild distress  Eyes: Conjunctivae normal.  ENT       Head: Normocephalic, atraumatic.       Nose: Normal external appearance        Mouth/Throat: no strigulous respirations   Hematological/Lymphatic/Immunilogical: no visible lymphadenopathy   Cardiovascular: Normal rate,   Respiratory: Normal respiratory effort.   Gastrointestinal: non distended   Musculoskeletal: Normal range of motion in all extremities. No obvious deformities or swelling.  Neurologic: Alert, oriented. Normal speech and language. No gross focal neurologic deficits are appreciated.  NIH 0  GCS- 15  CN2-12 intact  5/5 strength all 4 extremities  Normal gait  Negative rhomberg  No appreciable drift  Skin: Skin is warm, dry. No rash noted.  Psychiatric: Mood and affect are normal.   ED Course   Procedures  Labs Reviewed   COMPREHENSIVE METABOLIC PANEL - Abnormal       Result Value    Sodium 141      Potassium 3.4 (*)     Chloride 104      CO2 24      Glucose 148 (*)     BUN 7 (*)     Creatinine 1.1      Calcium 10.0      Total Protein 8.3      Albumin 4.7      Total Bilirubin 0.7      Alkaline Phosphatase 93      AST 33      ALT 49 (*)     eGFR >60      Anion Gap 13     CBC W/ AUTO DIFFERENTIAL    WBC 4.63      RBC 5.33      Hemoglobin 16.3      Hematocrit 47.3      MCV 89      MCH 30.6      MCHC 34.5      RDW 13.8      Platelets 283      MPV 10.1      Immature Granulocytes 0.2      Gran # (ANC) 1.8      Immature Grans (Abs) 0.01      Lymph # 2.0      Mono # 0.6      Eos # 0.2      Baso # 0.05      nRBC 0      Gran % 38.9      Lymph  % 43.6      Mono % 11.9      Eosinophil % 4.3      Basophil % 1.1      Differential Method Automated     B-TYPE NATRIURETIC PEPTIDE    BNP <10     TROPONIN I    Troponin I <0.006          ECG Results              EKG 12-lead (Preliminary result)  Result time 12/29/24 11:05:35      Wet Read by Mario Etienne MD (12/29/24 11:05:35, [facility] Emergency Dept, Emergency Medicine)    My EKG interpretation, sinus rhythm, 78 beats per minute, left axis deviation, no ST segment changes, poor R-wave progression, when compared to previous EKG 03/24/2015 Q-wave in V4 V5 is new                                  Imaging Results              CT Head Without Contrast (Final result)  Result time 12/29/24 10:53:52      Final result by Jhonathan Owens DO (12/29/24 10:53:52)                   Impression:      No acute intracranial findings as detailed above specifically without evidence for acute intracranial hemorrhage or sulcal effacement to suggest large territory recent infarction.    Further evaluation with MRI as warranted if patient.      Electronically signed by: Jhonathan Owens DO  Date:    12/29/2024  Time:    10:53               Narrative:    EXAMINATION:  CT HEAD WITHOUT CONTRAST    CLINICAL HISTORY:  Dizziness, persistent/recurrent, cardiac or vascular cause suspected;    TECHNIQUE:  Multiple sequential 5 mm axial images of the head without contrast.  Coronal and sagittal reformatted imaging from the axial acquisition.    COMPARISON:  None    FINDINGS:  There is no evidence for acute intracranial hemorrhage or sulcal effacement.  Mild patchy decreased attenuation supratentorial white matter which is nonspecific and may be sequela of chronic ischemic change.  The ventricles are normal in size without hydrocephalus.  There is no midline shift or mass effect.  Visualized paranasal sinuses and mastoid air cells are clear.                                       X-Ray Chest AP Portable (Final result)  Result time 12/29/24  12:37:09      Final result by Jose Gray MD (12/29/24 12:37:09)                   Impression:      No acute cardiopulmonary abnormality.      Electronically signed by: Jose Gray  Date:    12/29/2024  Time:    12:37               Narrative:    EXAMINATION:  XR CHEST AP PORTABLE    CLINICAL HISTORY:  Dizziness and giddiness    TECHNIQUE:  Single frontal view of the chest was performed.    COMPARISON:  09/20/2014    FINDINGS:  Cardiomediastinal silhouette within normal limits.  No focal confluent opacity or lobar consolidation.  No pleural effusion or gross pneumothorax.  No acute osseous abnormality.                                       Medications   meclizine tablet 25 mg (25 mg Oral Given 12/29/24 1209)     Medical Decision Making  Differential diagnosis-vertigo, sinusitis, hypertensive urgency, hypertensive emergency, sleep apnea, CVA, arrhythmia    Neuro intact at this time, generally well-appearing.    Moderately hypertensive.    Dizziness consistent with likely vertiginous symptoms particularly in the setting of tinnitus.    Exam is otherwise relatively unrevealing.  Does have some tenderness to percussion in the frontal sinuses.    Exam reassuring evaluation otherwise unrevealing.    Low suspicion for more serious intracranial process specifically arrhythmia or CVA.  Will plan for discharge at this time, outpatient follow-up and returning case of any worsening of symptoms.    Problems Addressed:  Dizziness: acute illness or injury  Hypertension, unspecified type: chronic illness or injury with exacerbation, progression, or side effects of treatment  Snores: chronic illness or injury    Amount and/or Complexity of Data Reviewed  External Data Reviewed: labs and notes.     Details: Previous encounters for gout  Labs: ordered. Decision-making details documented in ED Course.  Radiology: ordered and independent interpretation performed. Decision-making details documented in ED  Course.  ECG/medicine tests: ordered and independent interpretation performed. Decision-making details documented in ED Course.    Risk  OTC drugs.  Prescription drug management.  Decision regarding hospitalization.               ED Course as of 12/30/24 1013   Sun Dec 29, 2024   1051 64-year-old man with a recent frequent history of recurrent dizziness in the ear pain for the last month more intense this morning than previously. [TK]      ED Course User Index  [TK] Mario Etienne MD                           Clinical Impression:  Final diagnoses:  [R42] Dizziness  [I10] Hypertension, unspecified type (Primary)  [R06.83] Snores          ED Disposition Condition    Discharge Stable          ED Prescriptions       Medication Sig Dispense Start Date End Date Auth. Provider    meclizine (ANTIVERT) 25 mg tablet Take 1 tablet (25 mg total) by mouth 3 (three) times daily as needed. 20 tablet 12/29/2024 -- Mario Etienne MD    fluticasone propionate (FLONASE) 50 mcg/actuation nasal spray 1 spray (50 mcg total) by Each Nostril route 2 (two) times daily as needed for Rhinitis or Allergies. 15 g 12/29/2024 -- Mario Etienne MD          Follow-up Information       Follow up With Specialties Details Why Contact Info Additional Information    [facility] Sleep Medicine Schedule an appointment as soon as possible for a visit  As needed, For a follow up visit about today [address]  [address] [id number] [phone] Turn at Entrance 1 on [facility] in [facility] and take elevators to Floor 2. Follow signs to [facility] [place]. Take [address] Elevators to Floor 5 for [address].             Mario Etienne MD  12/30/24 1013

## 2024-12-30 LAB
OHS QRS DURATION: 98 MS
OHS QTC CALCULATION: 442 MS